# Patient Record
Sex: MALE | URBAN - METROPOLITAN AREA
[De-identification: names, ages, dates, MRNs, and addresses within clinical notes are randomized per-mention and may not be internally consistent; named-entity substitution may affect disease eponyms.]

---
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
## 2025-01-10 NOTE — PROCEDURE: MEDICATION COUNSELING
----- Message from Physician Sudeep Girard M.D. sent at 1/10/2025  9:20 AM PST -----  Please set up an echocardiogram, follow-up with me in 3 months for this patient   Protopic Counseling: Patient may experience a mild burning sensation during topical application. Protopic is not approved in children less than 2 years of age. There have been case reports of hematologic and skin malignancies in patients using topical calcineurin inhibitors although causality is questionable.